# Patient Record
Sex: MALE | Race: BLACK OR AFRICAN AMERICAN | NOT HISPANIC OR LATINO | Employment: OTHER | URBAN - METROPOLITAN AREA
[De-identification: names, ages, dates, MRNs, and addresses within clinical notes are randomized per-mention and may not be internally consistent; named-entity substitution may affect disease eponyms.]

---

## 2020-08-05 ENCOUNTER — OFFICE VISIT (OUTPATIENT)
Dept: PODIATRY | Facility: CLINIC | Age: 65
End: 2020-08-05
Payer: MEDICARE

## 2020-08-05 VITALS
RESPIRATION RATE: 18 BRPM | DIASTOLIC BLOOD PRESSURE: 67 MMHG | BODY MASS INDEX: 45.47 KG/M2 | WEIGHT: 300 LBS | HEART RATE: 60 BPM | HEIGHT: 68 IN | TEMPERATURE: 98 F | SYSTOLIC BLOOD PRESSURE: 116 MMHG

## 2020-08-05 DIAGNOSIS — M21.40 PES PLANUS, UNSPECIFIED LATERALITY: ICD-10-CM

## 2020-08-05 DIAGNOSIS — R26.2 DIFFICULTY IN WALKING, NOT ELSEWHERE CLASSIFIED: ICD-10-CM

## 2020-08-05 DIAGNOSIS — I89.0 LYMPHEDEMA OF BOTH LOWER EXTREMITIES: Primary | ICD-10-CM

## 2020-08-05 PROCEDURE — 99203 OFFICE O/P NEW LOW 30 MIN: CPT | Mod: S$GLB,,, | Performed by: PODIATRIST

## 2020-08-05 PROCEDURE — 99203 PR OFFICE/OUTPT VISIT, NEW, LEVL III, 30-44 MIN: ICD-10-PCS | Mod: S$GLB,,, | Performed by: PODIATRIST

## 2020-08-05 PROCEDURE — 1101F PT FALLS ASSESS-DOCD LE1/YR: CPT | Mod: S$GLB,,, | Performed by: PODIATRIST

## 2020-08-05 PROCEDURE — 1101F PR PT FALLS ASSESS DOC 0-1 FALLS W/OUT INJ PAST YR: ICD-10-PCS | Mod: S$GLB,,, | Performed by: PODIATRIST

## 2020-08-05 PROCEDURE — 3008F BODY MASS INDEX DOCD: CPT | Mod: S$GLB,,, | Performed by: PODIATRIST

## 2020-08-05 PROCEDURE — 3008F PR BODY MASS INDEX (BMI) DOCUMENTED: ICD-10-PCS | Mod: S$GLB,,, | Performed by: PODIATRIST

## 2020-08-05 RX ORDER — IPRATROPIUM BROMIDE 42 UG/1
1 SPRAY, METERED NASAL
COMMUNITY
Start: 2018-09-27

## 2020-08-05 RX ORDER — ZOLPIDEM TARTRATE 5 MG/1
TABLET ORAL
COMMUNITY
Start: 2020-07-23

## 2020-08-05 RX ORDER — FUROSEMIDE 40 MG/1
TABLET ORAL
COMMUNITY
Start: 2020-07-06

## 2020-08-05 RX ORDER — ASPIRIN 325 MG
325 TABLET ORAL
COMMUNITY
Start: 2016-09-27

## 2020-08-05 RX ORDER — ALBUTEROL SULFATE 90 UG/1
AEROSOL, METERED RESPIRATORY (INHALATION)
COMMUNITY
Start: 2020-07-23

## 2020-08-05 RX ORDER — SPIRONOLACTONE 25 MG/1
TABLET ORAL
COMMUNITY
Start: 2020-07-06

## 2020-08-05 RX ORDER — ROPINIROLE 0.25 MG/1
TABLET, FILM COATED ORAL
COMMUNITY
Start: 2020-07-23

## 2020-08-05 RX ORDER — NITROGLYCERIN 0.4 MG/1
TABLET SUBLINGUAL
COMMUNITY
Start: 2020-07-06

## 2020-08-05 RX ORDER — ALLOPURINOL 300 MG/1
300 TABLET ORAL
COMMUNITY

## 2020-08-05 RX ORDER — OMEPRAZOLE 20 MG/1
CAPSULE, DELAYED RELEASE ORAL
COMMUNITY
Start: 2020-07-06

## 2020-08-05 RX ORDER — CARVEDILOL 3.12 MG/1
TABLET ORAL
COMMUNITY
Start: 2020-07-06

## 2020-08-05 RX ORDER — POTASSIUM CHLORIDE 20 MEQ/1
TABLET, EXTENDED RELEASE ORAL
COMMUNITY
Start: 2020-07-06

## 2020-08-05 RX ORDER — NYSTATIN 100000 [USP'U]/G
100000 POWDER TOPICAL
COMMUNITY
Start: 2020-03-13

## 2020-08-05 RX ORDER — PRAVASTATIN SODIUM 40 MG/1
TABLET ORAL
COMMUNITY
Start: 2020-07-06

## 2020-08-05 NOTE — PROGRESS NOTES
[address] [facility]  [place] [address]  Phone: [phone]   [phone]    Patient's PCP:Ellie Parrish MD  Referring Provider: No ref. provider found    Subjective:      Chief Complaint:: Foot Swelling (bilateral lyphedema)    JUNE Chowdhury is a 64 y.o. male who presents with a complaint of bilateral lyphedeam lasting for six to seven years. Onset of the symptoms was pain and swelling.  Current symptoms include swelling dry cracked skin and burning.  Aggravating factors are walking and weightbearing. Symptoms have remained.Treatment to date have included compresson wrap, previous wound care discontinue after healing. Patients rates pain 8/10 on pain scale. Patient is also requesting order for custom shoes.     Systemic Doctor: Ellie Munoz MD  Date Last Seen: July 2020    Vitals:    08/05/20 0901   BP: 116/67   Pulse: 60   Resp: 18   Temp: 98 °F (36.7 °C)     Shoe Size: previous size 12 recently wore 16    No past surgical history on file.  No past medical history on file.  No family history on file.     Social History:   Marital Status: Single  Alcohol History:  has no history on file for alcohol.  Tobacco History:  has no history on file for tobacco.  Drug History:  has no history on file for drug.    Review of patient's allergies indicates:   Allergen Reactions    Sulfa (sulfonamide antibiotics)        Current Outpatient Medications   Medication Sig Dispense Refill    aspirin 325 MG tablet Take 325 mg by mouth.      ipratropium (ATROVENT) 42 mcg (0.06 %) nasal spray 1 spray by Nasal route.      nystatin (MYCOSTATIN) powder Apply 100,000 Units topically.      albuterol (PROVENTIL/VENTOLIN HFA) 90 mcg/actuation inhaler       allopurinoL (ZYLOPRIM) 300 MG tablet Take 300 mg by mouth.      carvediloL (COREG) 3.125 MG tablet       furosemide (LASIX) 40 MG tablet       nitroGLYCERIN (NITROSTAT) 0.4 MG SL tablet       omeprazole (PRILOSEC) 20 MG capsule       potassium chloride SA  (K-DUR,KLOR-CON) 20 MEQ tablet       pravastatin (PRAVACHOL) 40 MG tablet       rOPINIRole (REQUIP) 0.25 MG tablet       spironolactone (ALDACTONE) 25 MG tablet       zolpidem (AMBIEN) 5 MG Tab        No current facility-administered medications for this visit.        Review of Systems      Objective:        Physical Exam:   Foot Exam  Physical Exam  Physical examination: General: Pt. is well-developed, well-nourished, appears stated age, in no acute distress, alert and oriented x 3.    Vascular: Dorsalis pedis and posterior tibial pulses are diminished Bilaterally. Toes are warm to touch.    There is decreased digital hair. Skin is atrophic, slightly hyperpigmented, and severely edematous. Capillary refill greater than 5 seconds all toes/distal feet.  Skin lobules throughout bilateral lower extremities and feet with skin folds.    Neurologic: [place]-Enrique 5.07 monofilament is decreased bilateral feet. Sharp/dull sensation absent Bilateral feet.    Vibratory sensation present bilateral    Musculoskeletal: limited joint range of motion without pain,  Bilateral feet and ankle joints. Muscle strength is 4/5 in all groups bilaterally.    Lymphatics: no lymphangitic streaking bilaterally.    Dermatologic: Elongated, thickened, dystrophic, discolored nails x 10. Xerosis Bilaterally.    Positive Stemmer sign bilaterally.      Imaging: .  Stage IV lymphedema.           Assessment:       1. Lymphedema of both lower extremities    2. Difficulty in walking, not elsewhere classified    3. Pes planus, unspecified laterality      Plan:   Lymphedema of both lower extremities  -     ORTHOTIC DEVICE (DME)  -     Ambulatory referral/consult to Physical/Occupational Therapy; Future; Expected date: 08/05/2020    Difficulty in walking, not elsewhere classified    Pes planus, unspecified laterality      Follow up in about 3 months (around 11/5/2020).    Procedures - None    Counseling:     I provided patient education verbally  regarding:   Patient diagnosis, treatment options, as well as alternatives, risks, and benefits.     This note was created using Dragon voice recognition software that occasionally misinterpreted phrases or words.       Prescribed Custom molded shoes prescribed for patient's chronic lymphedema, stage 4     Referral to lymphedema therapist outpatient.    Patient would benefit from lymphedema therapy and compression wrapping.

## 2020-08-18 ENCOUNTER — CLINICAL SUPPORT (OUTPATIENT)
Dept: REHABILITATION | Facility: HOSPITAL | Age: 65
End: 2020-08-18
Attending: PODIATRIST
Payer: MEDICARE

## 2020-08-18 DIAGNOSIS — I89.0 LYMPHEDEMA OF BOTH LOWER EXTREMITIES: Primary | ICD-10-CM

## 2020-08-18 PROCEDURE — 97165 OT EVAL LOW COMPLEX 30 MIN: CPT

## 2020-08-18 PROCEDURE — 97530 THERAPEUTIC ACTIVITIES: CPT

## 2020-08-18 NOTE — PLAN OF CARE
[facility] Outpatient Occupational Therapy  Initial Evaluation for Lymphedema     Name: Adriano Chowdhury  Clinic Number: [id number]    Therapy Diagnosis:   Encounter Diagnosis   Name Primary?    Lymphedema of both lower extremities Yes     Physician: Jenn Núñez DPM    Physician Orders: Eval and Treat  Medical Diagnosis: I89.0 (ICD-10-CM) - Lymphedema of both lower extremities  Evaluation Date: 8/18/2020  Insurance Authorization period Expiration: 08/18/2020- auth after eval  Plan of Care Certification Period: 08/18/2020-11/18/2020  Visit # / Visits Authortized: 1/1 auth after eval  Time In: 2:30  Time Out: 3:30  Total Billable Time: 60 minutes    Precautions: Standard, pacemaker and Oxygen dependent    Subjective     Adriano rates pain at a 2/10 where 0 = no pain and 10 = maximal pain. Best pain gets 2/10. Worst pain gets 8/10.  He describes pain as a sharp pain.  The pain is intermittent.   Patient's goals are as follows: Reduce the pain and swelling.    History of Present Illness: He reports that his foot started swelling approximately 4 to 5 years ago and he was told he has lymphedema and has developed wounds that were treated.  He reports that he no longer has any wounds.  Onset: He reports that the swelling has gotten worse over the past 4 weeks.    Previous Lymphedema Treatment: none    Current Level of Function: Stand by assistance for dressing, not able to work in the garden and is no longer driving.  Prior Level of Function: He was independent with self care and working in his garden and was driving.  Occupation/Duties: Medical disability was previously employed by the [employer].    Adriano Chowdhury  has no past medical history on file.    Adriano Chowdhury  has no past surgical history on file.    Adriano has a current medication list which includes the following prescription(s): albuterol, allopurinol, aspirin, carvedilol, furosemide, ipratropium, nitroglycerin, nystatin, omeprazole, potassium chloride  sa, pravastatin, ropinirole, spironolactone, and zolpidem.    Review of patient's allergies indicates:   Allergen Reactions    Sulfa (sulfonamide antibiotics)           Objective     Amount of Swelling: Severe  Location of Swelling: Both lower extremities  Skin Integrity: Poor, fungal infection present on the legs and feet, Hyperkeratosis  Palpation/Texture: irregular and Papillomas, lumpy and many creases  Circulation: warm, well perfused, Hyperpigmentation  Posture: Poor    Range of Motion: WNLs    Strength: WNL    Adaptive Device used for ambulation: None      Lower Extremity Girth Measurements (in centimeters)  LANDMARK  RLE eval LLE eval   SBP+ 10 68 cm 72 cm   Knee 60 cm 73 cm   50 cm 60 cm 68 cm   40 cm  56 cm 68 cm   30 cm 68 cm 52.5 cm   20 cm  65 cm 44 cm   10 cm  53.5 cm 39 cm   Malleolus 46 cm 42 cm   Ankle- heel to dorsum of foot 44 cm 45 cm   Arch 47 cm 34.5 cm   Total Girth Measurement 567.5 cm 538   Total Girth Difference 29.5    Total Girth Reduction     TGD- Total Girth Difference  GGD - Greatest Girth Difference SBP- Superior Border of Patella    Single Limb Involvement  Mild = <10 cm< 5 cm (at site ) of GGD  Mild/Mod = 10 cm-20 cm TGD or 5.1 cm -10 cm GGD  Moderate = 20 -40 cm TGD or 10.1 cm -15cm GGD  Moderate/Severe = 40.1 cm TGD or 15.1 cm -20 cm GGD    Bilateral Limb Involvement  Moderate- 10-cm-15 cm TGD or < 5 cm GGD  Moderate/Severe- 15.1-20 cm TGD or 5.1 cm -10 cm GGD  Severe => 20 cm TGD or >10 cm GGD    Sensation: Impaired    Treatment     Treatment Time In (separate from total time) : 03:00  Treatment Time Out (separate from total time : 03:30    MULTILAYERED BANDAGING: issued supplies and bandaged BLEs with cotton stockinette, cotton cast padding from the ankle to the popliteal fold, 1-6cm and 1-8 cm  And 1-10 cm Durelast rolls from the toes to the popliteal fold, to leave intact 12-24 hrs, dc with any problems, return rolled bandages next session.     Issued HEP and  pt verbally  understood the instructions as they were given and demonstrated proper form and technique during therapy. Pt was advise to perform these exercises free of pain, and to stop performing them if pain occurs.    Patient/Family Education: role of OT, goals for OT, scheduling/cancellations - pt verbalized understanding. Discussed insurance limitations with patient. Pt was educated in lymphedema etiology and management plans.  Pt was provided with written  risk reductions and precautions for managing lymphedema.      Assessment     This patient presents with chronic swelling in both lower extremities  resulting in: lymphedema of the LEs, increased pain, increased stiffness in the ankles, as well as difficulty finding shoes to fit , and placing the pt at higher risk of infection. Following medical record review it is determined that pt will benefit from occupational therapy services in order to maximize pain free and/or functional use of bilateral LEs. The following goals were discussed with the patient and patient is in agreement with them as to be addressed in the treatment plan. The patient's rehab potential is Good.     Anticipated barriers to occupational therapy: transportation issues  Pt has no cultural, educational or language barriers to learning provided.    Profile and History Assessment of Occupational Performance Level of Clinical Decision Making Complexity Score   Occupational Profile:   Adriano Chowdhury is a 65 y.o. male who lives with their family and is currently on disability. Adriano Chowdhury has difficulty with  dressing  housework/household chores  affecting his/her daily functional abilities. His/her main goal for therapy is to reduce the pain and swelling.     Comorbidities:   financial considerations, high BMI and Cardiac issues    Medical and Therapy History Review:   Brief               Performance Deficits    Physical:  Skin Integrity/Scar Formation  Edema    Cognitive:  No Deficits    Psychosocial:    No  Deficits     Clinical Decision Making:  high    Assessment Process:  Problem-Focused Assessments    Modification/Need for Assistance:  Minimal-Moderate Modifications/Assistance    Intervention Selection:  Several Treatment Options       low  Based on PMHX, co morbidities , data from assessments and functional level of assistance required with task and clinical presentation directly impacting function.         Short Term Goals: (4 weeks)  Pt to be I and compliant with HEP to increase lymphatic flow.  Decrease girth in BLEs by 10 cm for improved functional use of BLEs.  Patient will demonstrate 100% knowledge of lymphedema precautions and signs of infection.   Patient will perform self-bandaging techniques.  Patient will perform self lymph drainage techniques to increase lymph uptake and direct lymph flow.  Patient will tolerate daily activities with multilayered bandaging or compression garment.  Skin integrity improve to Good, skin will be superficially hydrated, fungal infection will resolve, color will fade to pink and temperature will be room temperature.  Wounds will heal.      Long Term Goals: (8 weeks)  Decrease girth in BLEs by 15 cm for improved functional use of BLEs.  Patient will show reduction in girth to mild or less with improved contour of limb.  Patient to blade/doff compression garment with daily compliance.   Patient will demonstrate the ability to independently manage condition by discharge.    Plan     Patient to be seen 1-2 x per week for 90 days for the medically necessary treatments to include: decongestive massage- Manual lymphatic drainage, Kinesio tape, skin care, multi layered bandaging, education in lymphedema precautions, self massage, self bandaging, and assistance in obtaining appropriate compression garment.  Therex, postural correction, and progression of HEP.      Daisy Castano, OT

## 2020-08-26 ENCOUNTER — CLINICAL SUPPORT (OUTPATIENT)
Dept: REHABILITATION | Facility: HOSPITAL | Age: 65
End: 2020-08-26
Payer: MEDICARE

## 2020-08-26 DIAGNOSIS — I89.0 LYMPHEDEMA OF BOTH LOWER EXTREMITIES: Primary | ICD-10-CM

## 2020-08-26 PROCEDURE — 97530 THERAPEUTIC ACTIVITIES: CPT

## 2020-08-26 PROCEDURE — 97140 MANUAL THERAPY 1/> REGIONS: CPT

## 2020-08-27 NOTE — PROGRESS NOTES
Occupational Therapy Daily Treatment Note     Name: Adriano Chowdhury  Clinic Number: [id number]    Therapy Diagnosis: No diagnosis found.  Physician: Jenn Núñez DPM    Visit Date: 8/26/2020       Physician Orders: Eval and Treat  Medical Diagnosis: I89.0 (ICD-10-CM) - Lymphedema of both lower extremities  Evaluation Date: 8/18/2020  Insurance Authorization period Expiration: 08/18/2020- auth after eval  Plan of Care Certification Period: 08/18/2020-11/18/2020  Visit # / Visits Authortized: 1/1 auth after eval  Time In: 2:30  Time Out: 3:30  Total Billable Time: 60 minutes     Precautions: Standard, pacemaker and Oxygen dependent  Subjective     Pt reports: That he was able to keep the compression bandages intact for 4 days.    He was compliant with the compression bandage wearing schedule as recommended.  Response to previous treatment: He had less fungal infection on the legs and the legs did appear smaller.  Functional change: none  Pain: 4/10  Location: bilateral LEs      Objective     Treatment:   Adriano received the following manual therapy techniques:- Manual Lymphatic Drainage and Therapeutic activities where compression bandages were applied to the: LEs for 60 minutes, including: MLD and short stretch compression bandaging     MANUAL LYMPHATIC DRAINAGE (MLD):    While seated with LEs in dependent position drainage of entire BLEs sincere lower leg, ankle, and foot with return proximally.  Both legs were washed Use of Aquaphor due to dryness.   Educated in self massage to abdominal areas, B inguinal areas, thigh, and remaining LE within reach.    MULTILAYERED BANDAGING:  issued supplies and bandaged BLE with cotton stockinette, cotton cast padding, Lopress compression bandages 6 cm, 8cm, 10 cm applied from the base of the toes to the popliteal fold applied in a figure 8 pattern.  Instructed to leave intact 12-48 hrs as tolerated, discontinue with any problems, return rolled bandages next session.     Home Exercises  Provided and Patient Education Provided     Education provided:      PATIENT/FAMILY Education: bandaging wear schedule,  HEP,  Beginning of self massage,  Self or assisted bandaging, compression options, and Risk reduction  Adriano demonstrated good  understanding of the education provided.       Assessment     He arrived to therapy without the compression bandages.  He removed them after 4 days.  The legs appeared to have less fungal infection and the legs were more supple with MLD.  The skin is covered with papillomas and has hyperpigmentation. He tolerated the compression bandages last session and was agreeable to have them reapplied.   Adriano is progressing well towards his goals.   Pt prognosis is Good.     Pt will continue to benefit from skilled outpatient occupational therapy to address the deficits listed in the problem list box on initial evaluation, provide pt/family education and to maximize pt's level of independence in the home and community environment.     Pt's spiritual, cultural and educational needs considered and pt agreeable to plan of care and goals.     Anticipated barriers to occupational therapy: transportation    Goals:   Short Term Goals: (4 weeks)  Pt to be I and compliant with HEP to increase lymphatic flow.  Decrease girth in BLEs by 10 cm for improved functional use of BLEs.  Patient will demonstrate 100% knowledge of lymphedema precautions and signs of infection.   Patient will perform self-bandaging techniques.  Patient will perform self lymph drainage techniques to increase lymph uptake and direct lymph flow.  Patient will tolerate daily activities with multilayered bandaging or compression garment.  Skin integrity improve to Good, skin will be superficially hydrated, fungal infection will resolve, color will fade to pink and temperature will be room temperature.  Wounds will heal.        Long Term Goals: (8 weeks)  Decrease girth in BLEs by 15 cm for improved functional use of  BLEs.  Patient will show reduction in girth to mild or less with improved contour of limb.  Patient to blade/doff compression garment with daily compliance.   Patient will demonstrate the ability to independently manage condition by discharge.    Plan   Continue OT  1-2x   weekl for 90 day for Complete Decongestive Therapy:  Manual lymphatic drainage, Multilayered short stretch bandaging,  Therapeutic exercises, Patient education as deemed necessary to achieve stated goals.      Daisy Castano, OT

## 2020-09-01 ENCOUNTER — CLINICAL SUPPORT (OUTPATIENT)
Dept: REHABILITATION | Facility: HOSPITAL | Age: 65
End: 2020-09-01
Payer: MEDICARE

## 2020-09-01 DIAGNOSIS — I89.0 LYMPHEDEMA OF BOTH LOWER EXTREMITIES: Primary | ICD-10-CM

## 2020-09-01 PROCEDURE — 97140 MANUAL THERAPY 1/> REGIONS: CPT

## 2020-09-03 NOTE — PROGRESS NOTES
Occupational Therapy Daily Treatment Note     Name: Adriano Chowdhury  Clinic Number: [id number]    Therapy Diagnosis:   Encounter Diagnosis   Name Primary?    Lymphedema of both lower extremities Yes     Physician: Jenn Núñez DPM    Visit Date: 9/1/2020       Physician Orders: Eval and Treat  Medical Diagnosis: I89.0 (ICD-10-CM) - Lymphedema of both lower extremities  Evaluation Date: 8/18/2020  Insurance Authorization period Expiration: 08/18/2020- auth after eval  Plan of Care Certification Period: 08/18/2020-10/7/2020  Visit # / Visits Authortized: 2/8  Time In: 4:30  Time Out: 5:30  Total Billable Time: 60 minutes     Precautions: Standard, pacemaker and Oxygen dependent  Subjective     Pt reports: That he was able to keep the compression bandages intact for 4 days.  He reports that he did not need the wheelchair today.   He was able to walk in the clinic with his cane.    He was compliant with the compression bandage wearing schedule as recommended.  Response to previous treatment: He had less fungal infection on the legs and the legs did appear smaller.  Functional change: none  Pain: 4/10  Location: bilateral LEs      Objective     Treatment:   Adriano received the following manual therapy techniques:- Manual Lymphatic Drainage and Therapeutic activities where compression bandages were applied to the: LEs for 60 minutes, including: MLD and short stretch compression bandaging     MANUAL LYMPHATIC DRAINAGE (MLD):    While seated with LEs in dependent position drainage of entire BLEs sincere lower leg, ankle, and foot with return proximally.  Both legs were washed Use of Aquaphor due to dryness.   Educated in self massage to abdominal areas, B inguinal areas, thigh, and remaining LE within reach.    MULTILAYERED BANDAGING:  issued supplies and bandaged BLE with cotton stockinette, cotton cast padding, Lopress compression bandages 6 cm, 8cm, 10 cm applied from the base of the toes to the popliteal fold applied in a  figure 8 pattern.  Instructed to leave intact 12-48 hrs as tolerated, discontinue with any problems, return rolled bandages next session.     Home Exercises Provided and Patient Education Provided     Education provided:      PATIENT/FAMILY Education: bandaging wear schedule,  HEP,  Beginning of self massage,  Self or assisted bandaging, compression options, and Risk reduction  Adriano demonstrated good  understanding of the education provided.       Assessment     He arrived to therapy without the compression bandages.  He removed them after 4 days.  The legs appeared to have less fungal infection and the legs were more supple with MLD.  The skin is covered with papillomas and has hyperpigmentation. He tolerated the compression bandages last session and was agreeable to have them reapplied. He was able to walk in to the clinic this date and did not require the wheel chair as he did on  His first two visits.  Adriano is progressing well towards his goals.   Pt prognosis is Good.     Pt will continue to benefit from skilled outpatient occupational therapy to address the deficits listed in the problem list box on initial evaluation, provide pt/family education and to maximize pt's level of independence in the home and community environment.     Pt's spiritual, cultural and educational needs considered and pt agreeable to plan of care and goals.     Anticipated barriers to occupational therapy: transportation    Goals:   Short Term Goals: (4 weeks)  Pt to be I and compliant with HEP to increase lymphatic flow.  Decrease girth in BLEs by 10 cm for improved functional use of BLEs.  Patient will demonstrate 100% knowledge of lymphedema precautions and signs of infection.   Patient will perform self-bandaging techniques.  Patient will perform self lymph drainage techniques to increase lymph uptake and direct lymph flow.  Patient will tolerate daily activities with multilayered bandaging or compression garment.  Skin integrity  improve to Good, skin will be superficially hydrated, fungal infection will resolve, color will fade to pink and temperature will be room temperature.  Wounds will heal.        Long Term Goals: (8 weeks)  Decrease girth in BLEs by 15 cm for improved functional use of BLEs.  Patient will show reduction in girth to mild or less with improved contour of limb.  Patient to blade/doff compression garment with daily compliance.   Patient will demonstrate the ability to independently manage condition by discharge.    Plan   Continue OT  1-2x   weekl for 90 day for Complete Decongestive Therapy:  Manual lymphatic drainage, Multilayered short stretch bandaging,  Therapeutic exercises, Patient education as deemed necessary to achieve stated goals.      Daisy Castano, OT

## 2020-09-04 ENCOUNTER — CLINICAL SUPPORT (OUTPATIENT)
Dept: REHABILITATION | Facility: HOSPITAL | Age: 65
End: 2020-09-04
Payer: MEDICARE

## 2020-09-04 DIAGNOSIS — I89.0 LYMPHEDEMA OF BOTH LOWER EXTREMITIES: Primary | ICD-10-CM

## 2020-09-04 PROCEDURE — 97530 THERAPEUTIC ACTIVITIES: CPT | Mod: 59

## 2020-09-04 PROCEDURE — 97140 MANUAL THERAPY 1/> REGIONS: CPT

## 2020-09-11 ENCOUNTER — CLINICAL SUPPORT (OUTPATIENT)
Dept: REHABILITATION | Facility: HOSPITAL | Age: 65
End: 2020-09-11
Payer: MEDICARE

## 2020-09-11 DIAGNOSIS — I89.0 LYMPHEDEMA OF BOTH LOWER EXTREMITIES: Primary | ICD-10-CM

## 2020-09-11 PROCEDURE — 97140 MANUAL THERAPY 1/> REGIONS: CPT

## 2020-09-11 NOTE — PROGRESS NOTES
Occupational Therapy Daily Treatment Note     Name: Adriano Chowdhury  Clinic Number: [id number]    Therapy Diagnosis:   Encounter Diagnosis   Name Primary?    Lymphedema of both lower extremities Yes     Physician: Jenn Núñez DPM    Visit Date: 9/11/2020       Physician Orders: Eval and Treat  Medical Diagnosis: I89.0 (ICD-10-CM) - Lymphedema of both lower extremities  Evaluation Date: 8/18/2020  Insurance Authorization period Expiration: 08/18/2020- auth after eval  Plan of Care Certification Period: 08/18/2020-10/7/2020  Visit # / Visits Authortized: 4/8  Time In: 3:30  Time Out: 4:30  Total Billable Time: 60 minutes     Precautions: Standard, pacemaker and Oxygen dependent  Subjective     Pt reports: That he was able to keep the compression bandages intact.  He reports that he did not need the wheelchair today.   He was able to walk in the clinic with his cane. He reports that his legs are feeling better.  He reports that they are not as heavy and his skin is looking better.  He was compliant with the compression bandage wearing schedule as recommended.  Response to previous treatment: He had less fungal infection on the legs and the legs did appear smaller.  Functional change: none  Pain: 4/10  Location: bilateral LEs      Objective     Treatment:   Adriano received the following manual therapy techniques 30 minutes:- Manual Lymphatic Drainage and Therapeutic activities 30 minutes  where compression bandages were applied to the: LEs for 40 minutes, including: MLD and short stretch compression bandaging     MANUAL LYMPHATIC DRAINAGE (MLD):    While seated with LEs in dependent position drainage of entire BLEs sincere lower leg, ankle, and foot with return proximally.  Both legs were washed Use of Aquaphor due to dryness.   Educated in self massage to abdominal areas, B inguinal areas, thigh, and remaining LE within reach.    MULTILAYERED BANDAGING:  issued supplies and bandaged BLE with cotton stockinette, cotton cast  padding, Lopress compression bandages 6 cm, 8cm, 10 cm applied from the base of the toes to the popliteal fold applied in a figure 8 pattern.  Instructed to leave intact 12-48 hrs as tolerated, discontinue with any problems, return rolled bandages next session.     Home Exercises Provided and Patient Education Provided     Education provided:      PATIENT/FAMILY Education: bandaging wear schedule,  HEP,  Beginning of self massage,  Self or assisted bandaging, compression options, and Risk reduction  Adriano demonstrated good  understanding of the education provided.       Assessment     He arrived to therapy without the compression bandages.  He removed them after 4 days.  The legs appeared to have less fungal infection and the legs were more supple with MLD.  The skin is covered with papillomas and has hyperpigmentation. He tolerated the compression bandages last session and was agreeable to have them reapplied. He was able to walk in to the clinic this date and did not require the wheel chair as he did on  His first two visits.  Adriano is progressing well towards his goals.   Pt prognosis is Good.     Pt will continue to benefit from skilled outpatient occupational therapy to address the deficits listed in the problem list box on initial evaluation, provide pt/family education and to maximize pt's level of independence in the home and community environment.     Pt's spiritual, cultural and educational needs considered and pt agreeable to plan of care and goals.     Anticipated barriers to occupational therapy: transportation    Goals:   Short Term Goals: (4 weeks)  Pt to be I and compliant with HEP to increase lymphatic flow.  Decrease girth in BLEs by 10 cm for improved functional use of BLEs.  Patient will demonstrate 100% knowledge of lymphedema precautions and signs of infection.   Patient will perform self-bandaging techniques.  Patient will perform self lymph drainage techniques to increase lymph uptake and  direct lymph flow.  Patient will tolerate daily activities with multilayered bandaging or compression garment.  Skin integrity improve to Good, skin will be superficially hydrated, fungal infection will resolve, color will fade to pink and temperature will be room temperature.  Wounds will heal.        Long Term Goals: (8 weeks)  Decrease girth in BLEs by 15 cm for improved functional use of BLEs.  Patient will show reduction in girth to mild or less with improved contour of limb.  Patient to blade/doff compression garment with daily compliance.   Patient will demonstrate the ability to independently manage condition by discharge.    Plan   Continue OT  1-2x   weekl for 90 day for Complete Decongestive Therapy:  Manual lymphatic drainage, Multilayered short stretch bandaging,  Therapeutic exercises, Patient education as deemed necessary to achieve stated goals.      Daisy Castano, OT

## 2020-09-24 ENCOUNTER — CLINICAL SUPPORT (OUTPATIENT)
Dept: REHABILITATION | Facility: HOSPITAL | Age: 65
End: 2020-09-24
Payer: MEDICARE

## 2020-09-24 DIAGNOSIS — I89.0 LYMPHEDEMA OF BOTH LOWER EXTREMITIES: Primary | ICD-10-CM

## 2020-09-24 PROCEDURE — 97140 MANUAL THERAPY 1/> REGIONS: CPT

## 2020-09-24 PROCEDURE — 97530 THERAPEUTIC ACTIVITIES: CPT | Mod: 59

## 2020-09-24 NOTE — PROGRESS NOTES
Occupational Therapy Daily Treatment Note     Name: Adriano Chowdhury  Clinic Number: [id number]    Therapy Diagnosis:   Encounter Diagnosis   Name Primary?    Lymphedema of both lower extremities Yes     Physician: Jenn Núñez DPM    Visit Date: 9/24/2020       Physician Orders: Eval and Treat  Medical Diagnosis: I89.0 (ICD-10-CM) - Lymphedema of both lower extremities  Evaluation Date: 8/18/2020  Insurance Authorization period Expiration: 08/18/2020- auth after eval  Plan of Care Certification Period: 08/18/2020-10/7/2020  Visit # / Visits Authortized: 5/8  Time In: 1:30  Time Out: 2:30  Total Billable Time: 60 minutes     Precautions: Standard, pacemaker and Oxygen dependent  Subjective     Pt reports: That he was unable to come to therapy due to not being able to get a ride.  He was able to walk in the clinic with his cane. He reports that his legs are feeling better.  He reports that they are not as heavy and his skin is looking better.  He was compliant with the compression bandage wearing schedule as recommended.  Response to previous treatment: He had less fungal infection on the legs and the legs did appear smaller.  Functional change: none  Pain: 4/10  Location: bilateral LEs      Objective     Treatment:   Adriano received the following manual therapy techniques 30 minutes:- Manual Lymphatic Drainage and Therapeutic activities 30 minutes  where compression bandages were applied to the: LEs for 40 minutes, including: MLD and short stretch compression bandaging     MANUAL LYMPHATIC DRAINAGE (MLD):    While seated with LEs in dependent position drainage of entire BLEs sincere lower leg, ankle, and foot with return proximally.  Both legs were washed Use of Aquaphor due to dryness.   Educated in self massage to abdominal areas, B inguinal areas, thigh, and remaining LE within reach.    MULTILAYERED BANDAGING:  issued supplies and bandaged BLE with cotton stockinette, cotton cast padding, Lopress compression bandages 6  cm, 8cm, 10 cm applied from the base of the toes to the popliteal fold applied in a figure 8 pattern.  Instructed to leave intact 12-48 hrs as tolerated, discontinue with any problems, return rolled bandages next session.     Home Exercises Provided and Patient Education Provided     Education provided:      PATIENT/FAMILY Education: bandaging wear schedule,  HEP,  Beginning of self massage,  Self or assisted bandaging, compression options, and Risk reduction  Adriano demonstrated good  understanding of the education provided.       Assessment     He arrived to therapy without the compression bandages.  He removed them after 4 days.  The legs appeared to have less fungal infection and the legs were more supple with MLD.  The skin is covered with papillomas and has hyperpigmentation. He tolerated the compression bandages last session and was agreeable to have them reapplied. He was able to walk in to the clinic this date and did not require the wheel chair as he did on  His first two visits.  Adriano is progressing well towards his goals.   Pt prognosis is Good.     Pt will continue to benefit from skilled outpatient occupational therapy to address the deficits listed in the problem list box on initial evaluation, provide pt/family education and to maximize pt's level of independence in the home and community environment.     Pt's spiritual, cultural and educational needs considered and pt agreeable to plan of care and goals.     Anticipated barriers to occupational therapy: transportation    Goals:   Short Term Goals: (4 weeks)  Pt to be I and compliant with HEP to increase lymphatic flow.  Decrease girth in BLEs by 10 cm for improved functional use of BLEs.  Patient will demonstrate 100% knowledge of lymphedema precautions and signs of infection.   Patient will perform self-bandaging techniques.  Patient will perform self lymph drainage techniques to increase lymph uptake and direct lymph flow.  Patient will tolerate  daily activities with multilayered bandaging or compression garment.  Skin integrity improve to Good, skin will be superficially hydrated, fungal infection will resolve, color will fade to pink and temperature will be room temperature.  Wounds will heal.        Long Term Goals: (8 weeks)  Decrease girth in BLEs by 15 cm for improved functional use of BLEs.  Patient will show reduction in girth to mild or less with improved contour of limb.  Patient to blade/doff compression garment with daily compliance.   Patient will demonstrate the ability to independently manage condition by discharge.    Plan   Continue OT  1-2x   weekl for 90 day for Complete Decongestive Therapy:  Manual lymphatic drainage, Multilayered short stretch bandaging,  Therapeutic exercises, Patient education as deemed necessary to achieve stated goals.      Daisy Castano, OT